# Patient Record
Sex: FEMALE | Race: WHITE | NOT HISPANIC OR LATINO | URBAN - NONMETROPOLITAN AREA
[De-identification: names, ages, dates, MRNs, and addresses within clinical notes are randomized per-mention and may not be internally consistent; named-entity substitution may affect disease eponyms.]

---

## 2023-02-08 ENCOUNTER — OFFICE VISIT (OUTPATIENT)
Dept: OBSTETRICS AND GYNECOLOGY | Facility: CLINIC | Age: 43
End: 2023-02-08
Payer: COMMERCIAL

## 2023-02-08 ENCOUNTER — LAB (OUTPATIENT)
Dept: LAB | Facility: HOSPITAL | Age: 43
End: 2023-02-08
Payer: COMMERCIAL

## 2023-02-08 VITALS
WEIGHT: 214 LBS | BODY MASS INDEX: 39.38 KG/M2 | HEIGHT: 62 IN | DIASTOLIC BLOOD PRESSURE: 80 MMHG | SYSTOLIC BLOOD PRESSURE: 134 MMHG

## 2023-02-08 DIAGNOSIS — Z12.31 SCREENING MAMMOGRAM, ENCOUNTER FOR: ICD-10-CM

## 2023-02-08 DIAGNOSIS — R45.86 MOOD CHANGES: Primary | ICD-10-CM

## 2023-02-08 LAB
ESTRADIOL SERPL HS-MCNC: 120 PG/ML
FSH SERPL-ACNC: 2.91 MIU/ML

## 2023-02-08 PROCEDURE — 83001 ASSAY OF GONADOTROPIN (FSH): CPT | Performed by: NURSE PRACTITIONER

## 2023-02-08 PROCEDURE — 99204 OFFICE O/P NEW MOD 45 MIN: CPT | Performed by: NURSE PRACTITIONER

## 2023-02-08 PROCEDURE — 36415 COLL VENOUS BLD VENIPUNCTURE: CPT | Performed by: NURSE PRACTITIONER

## 2023-02-08 PROCEDURE — 82670 ASSAY OF TOTAL ESTRADIOL: CPT | Performed by: NURSE PRACTITIONER

## 2023-02-08 RX ORDER — FLUOXETINE 10 MG/1
1 CAPSULE ORAL DAILY
COMMUNITY
Start: 2023-01-22

## 2023-02-08 NOTE — PROGRESS NOTES
"Subjective   Nya Diez is a 42 y.o. here here for check hormones    History of Present Illness  Nya Diez is a 42 yr old  female with history of hysterectomy in  for benign reasons ( dysmenorrhea, ectopic pregnancy) presents today to have her hormones checked. Pt reports that she had a \"mental breakdown\" a few weeks ago and her PCP told her it is likely related to hormones. Was started on lexapro at this visit. Pt also voices that she needs a pap test and mammogram; has not had a pap since before her hysterectomy in  but can't do these today. Of note, had a LEEP procedure in . Reports of hot flashes occurring every 1.5 weeks; does vape with nicotine. Does also report a decreased in sex drive. Does not want hormones if she does not truly need it as \"it would be another medication to take.\"       The following portions of the patient's history were reviewed and updated as appropriate: allergies, current medications, past family history, past medical history, past social history, past surgical history and problem list.    Review of Systems   Endocrine: Positive for heat intolerance. Negative for cold intolerance.   Psychiatric/Behavioral:        \"mood changes\"; decreased sex drive       Objective   Physical Exam  Vitals and nursing note reviewed.   Constitutional:       Appearance: Normal appearance.   Pulmonary:      Effort: Pulmonary effort is normal.   Neurological:      Mental Status: She is alert.   Psychiatric:         Mood and Affect: Mood normal.         Behavior: Behavior normal.           Assessment & Plan   Diagnoses and all orders for this visit:    1. Mood changes (Primary)  -     Follicle Stimulating Hormone  -     Estradiol    2. Screening mammogram, encounter for  -     Mammo Screening Digital Tomosynthesis Bilateral With CAD; Future      Discussed limitations of checking hormones as hormone levels fluctuate during perimenoapuse, but will check to see if FSH is in " postmenopausal range as pt does not have periods. Discussed average of menopause is 51-52 years old. I recommend to continue on Lexapro to see if this help with her mood. Briefly discussed HRT and risk and benefits (helpful for hot flashes, mood swings, sex drive) but pt does not have a strong interest in starting. I recommend vaping cessation.  Pt will return for mammogram and well woman visit; she does need vaginal pap cotesting every 3 years due to hx of LEEP in 2005 (need screening till 2030 - 25 years post-surveillance).

## 2023-02-09 ENCOUNTER — TELEPHONE (OUTPATIENT)
Dept: OBSTETRICS AND GYNECOLOGY | Facility: CLINIC | Age: 43
End: 2023-02-09
Payer: COMMERCIAL

## 2023-02-09 NOTE — TELEPHONE ENCOUNTER
Attempted to call the pt REGARDING LAB RESULTS .  There was no answer so I left a msg on pt's voicemail for her to return my call at her earliest convenience.  SARAH HESS PT.

## 2023-02-09 NOTE — TELEPHONE ENCOUNTER
PATIENT RETURNED A CALL TO MARJAN IN REGARDS TO LAB RESULTS FROM ARLIN HESS. SHE WILL BE GOING TO WORK AT 1. HER NUMBER TO CALL BACK [phone]. SHE SAID SHE WILL TRY TO KEEP HER PHONE ON HER.          THANKS,        SHILPA

## 2023-02-22 ENCOUNTER — OFFICE VISIT (OUTPATIENT)
Dept: OBSTETRICS AND GYNECOLOGY | Facility: CLINIC | Age: 43
End: 2023-02-22
Payer: COMMERCIAL

## 2023-02-22 VITALS
HEIGHT: 62 IN | WEIGHT: 214 LBS | DIASTOLIC BLOOD PRESSURE: 72 MMHG | BODY MASS INDEX: 39.38 KG/M2 | SYSTOLIC BLOOD PRESSURE: 114 MMHG

## 2023-02-22 DIAGNOSIS — Z12.72 VAGINAL PAP SMEAR: Primary | ICD-10-CM

## 2023-02-22 DIAGNOSIS — Z98.890 HISTORY OF LOOP ELECTRICAL EXCISION PROCEDURE (LEEP): ICD-10-CM

## 2023-02-22 PROCEDURE — 99396 PREV VISIT EST AGE 40-64: CPT | Performed by: NURSE PRACTITIONER

## 2023-02-22 NOTE — PROGRESS NOTES
Subjective   Nya Diez is a 42 y.o. here for gyn annual    History of Present Illness  Nya Diez is a 42 yr old  female with history of hysterectomy in  for benign reasons and hx of LEEP in  presents today for her gyn annual. Is needing a vaginal pap cotesting for surveillance after hysterectomy (screening till ). Has not has a pap test since before her hysterectomy. Is on Prozac 10mg for mood changes but it is not working well enough. Pt shares that her therapist is sending her to see a psychiatrist to do additional labwork and further medication management. Pt had previously been concerned for menopause symptoms. FSH and estradiol WNL at this time and pt counseled  she can be considered perimenopause since she is having hot flashes. Denies any gyn concerns today. Declines STI screening. Accompanied by her daughter today.       The following portions of the patient's history were reviewed and updated as appropriate: allergies, current medications, past family history, past medical history, past social history, past surgical history and problem list.    Review of Systems   Constitutional: Negative for chills, fatigue and fever.   Gastrointestinal: Negative for abdominal pain, constipation, diarrhea and nausea.   Endocrine: Positive for heat intolerance. Negative for cold intolerance.   Genitourinary: Negative for frequency, vaginal bleeding, vaginal discharge and vaginal pain.   Psychiatric/Behavioral: Negative for depressed mood.        Mood changes       Objective   Physical Exam  Vitals and nursing note reviewed. Exam conducted with a chaperone present.   Constitutional:       Appearance: She is well-developed.   HENT:      Head: Normocephalic and atraumatic.   Neck:      Thyroid: No thyromegaly.   Cardiovascular:      Rate and Rhythm: Normal rate and regular rhythm.      Heart sounds: Normal heart sounds.   Pulmonary:      Effort: Pulmonary effort is normal. No respiratory distress.       Breath sounds: Normal breath sounds.   Chest:   Breasts:     Right: Normal.      Left: Normal.   Abdominal:      General: There is no distension.      Palpations: Abdomen is soft.      Tenderness: There is no abdominal tenderness.   Genitourinary:     General: Normal vulva.      Vagina: Normal.      Uterus: Absent.       Rectum: Normal.      Comments: Vaginal Pap cotesting collected. Adnexa nonpalpable.   Musculoskeletal:         General: Normal range of motion.   Skin:     General: Skin is warm and dry.   Neurological:      Mental Status: She is alert and oriented to person, place, and time.   Psychiatric:         Behavior: Behavior normal.           Assessment & Plan   Diagnoses and all orders for this visit:    1. Vaginal Pap smear (Primary)  -     LIQUID-BASED PAP SMEAR, [facility] ([facility]); Future  -     LIQUID-BASED PAP SMEAR, [facility] ([facility])    2. History of loop electrical excision procedure (LEEP)  -     LIQUID-BASED PAP SMEAR, [facility] ([facility]); Future  -     LIQUID-BASED PAP SMEAR, [facility] ([facility])      Reviewed vaginal pap screening guidelines, breast awareness, and annual mammogram. Return in 1 year or as needed.

## 2023-02-24 LAB — REF LAB TEST METHOD: NORMAL
